# Patient Record
Sex: FEMALE | Race: WHITE | NOT HISPANIC OR LATINO | Employment: OTHER | URBAN - NONMETROPOLITAN AREA
[De-identification: names, ages, dates, MRNs, and addresses within clinical notes are randomized per-mention and may not be internally consistent; named-entity substitution may affect disease eponyms.]

---

## 2017-01-27 ENCOUNTER — TRANSCRIBE ORDERS (OUTPATIENT)
Dept: LAB | Facility: HOSPITAL | Age: 67
End: 2017-01-27

## 2017-01-27 ENCOUNTER — HOSPITAL ENCOUNTER (OUTPATIENT)
Dept: GENERAL RADIOLOGY | Facility: HOSPITAL | Age: 67
Discharge: HOME | End: 2017-01-27
Admitting: NURSE PRACTITIONER

## 2017-01-27 DIAGNOSIS — F17.290 NICOTINE DEPENDENCE, OTHER TOBACCO PRODUCT, UNCOMPLICATED: Primary | ICD-10-CM

## 2017-01-27 DIAGNOSIS — F17.290 NICOTINE DEPENDENCE, OTHER TOBACCO PRODUCT, UNCOMPLICATED: ICD-10-CM

## 2017-01-27 PROCEDURE — 71020 HC CHEST PA AND LATERAL: CPT

## 2017-05-24 ENCOUNTER — PROCEDURE VISIT (OUTPATIENT)
Dept: OTOLARYNGOLOGY | Facility: CLINIC | Age: 67
End: 2017-05-24

## 2017-05-24 ENCOUNTER — OFFICE VISIT (OUTPATIENT)
Dept: OTOLARYNGOLOGY | Facility: CLINIC | Age: 67
End: 2017-05-24

## 2017-05-24 VITALS
SYSTOLIC BLOOD PRESSURE: 119 MMHG | HEART RATE: 72 BPM | WEIGHT: 150.13 LBS | DIASTOLIC BLOOD PRESSURE: 78 MMHG | BODY MASS INDEX: 24.13 KG/M2 | TEMPERATURE: 98.6 F | HEIGHT: 66 IN

## 2017-05-24 DIAGNOSIS — H93.13 TINNITUS, BILATERAL: Primary | ICD-10-CM

## 2017-05-24 DIAGNOSIS — H90.5 HEARING LOSS, SENSORINEURAL: ICD-10-CM

## 2017-05-24 DIAGNOSIS — H90.5 HEARING LOSS, SENSORINEURAL: Primary | ICD-10-CM

## 2017-05-24 DIAGNOSIS — H93.13 TINNITUS, BILATERAL: ICD-10-CM

## 2017-05-24 DIAGNOSIS — D49.6 BRAIN TUMOR (HCC): ICD-10-CM

## 2017-05-24 PROCEDURE — 92553 AUDIOMETRY AIR & BONE: CPT | Performed by: AUDIOLOGIST-HEARING AID FITTER

## 2017-05-24 PROCEDURE — 92567 TYMPANOMETRY: CPT | Performed by: AUDIOLOGIST-HEARING AID FITTER

## 2017-05-24 PROCEDURE — 99203 OFFICE O/P NEW LOW 30 MIN: CPT | Performed by: NURSE PRACTITIONER

## 2017-05-24 RX ORDER — CITALOPRAM 40 MG/1
TABLET ORAL
COMMUNITY
End: 2022-08-26 | Stop reason: ALTCHOICE

## 2017-05-24 RX ORDER — OLMESARTAN MEDOXOMIL 40 MG/1
TABLET ORAL
COMMUNITY
Start: 2011-05-14 | End: 2022-08-26

## 2017-05-24 RX ORDER — AMLODIPINE BESYLATE 5 MG/1
TABLET ORAL
COMMUNITY
End: 2022-08-26

## 2017-05-24 RX ORDER — NITROGLYCERIN 0.4 MG/1
TABLET SUBLINGUAL
COMMUNITY
Start: 2015-03-30

## 2017-05-24 RX ORDER — CLONAZEPAM 0.5 MG/1
0.5 TABLET ORAL 3 TIMES DAILY PRN
COMMUNITY

## 2017-05-24 RX ORDER — ONDANSETRON 4 MG/1
4 TABLET, ORALLY DISINTEGRATING ORAL EVERY 8 HOURS PRN
COMMUNITY

## 2017-05-24 RX ORDER — PANTOPRAZOLE SODIUM 40 MG/1
40 TABLET, DELAYED RELEASE ORAL 2 TIMES DAILY
COMMUNITY
Start: 2016-08-26

## 2017-05-24 RX ORDER — ATORVASTATIN CALCIUM 40 MG/1
TABLET, FILM COATED ORAL
COMMUNITY
End: 2020-11-30 | Stop reason: SDUPTHER

## 2017-05-24 RX ORDER — TIZANIDINE 4 MG/1
4 TABLET ORAL EVERY 8 HOURS PRN
COMMUNITY

## 2017-05-24 RX ORDER — CLOPIDOGREL BISULFATE 75 MG/1
75 TABLET ORAL DAILY
COMMUNITY
Start: 2017-03-06

## 2017-07-24 ENCOUNTER — PROCEDURE VISIT (OUTPATIENT)
Dept: OTOLARYNGOLOGY | Facility: CLINIC | Age: 67
End: 2017-07-24

## 2017-07-24 DIAGNOSIS — H90.5 HEARING LOSS, SENSORINEURAL: Primary | ICD-10-CM

## 2017-07-24 PROCEDURE — HEARINGNOCHG: Performed by: AUDIOLOGIST-HEARING AID FITTER

## 2017-09-15 ENCOUNTER — PROCEDURE VISIT (OUTPATIENT)
Dept: OTOLARYNGOLOGY | Facility: CLINIC | Age: 67
End: 2017-09-15

## 2017-09-15 DIAGNOSIS — H90.5 HEARING LOSS, SENSORINEURAL: Primary | ICD-10-CM

## 2017-09-15 NOTE — PROGRESS NOTES
Valentina Grullon, age 66, was seen at this office for a hearing aid fitting. She has an established mild to moderate SNHL at both ears. She was fit with Signia Cellion 7PX -in-the-canal hearing aids (SN : [id number]; LE#[id number]) with size S1 receivers and 4mm domes.  The hearing aids were fit to NAL-NL2 targets using speech stimuli. She reported that the hearing aids sounded good and were not uncomfortable or too loud.   Care and use of the hearing instruments were then covered.    DIAGNOSIS:  Mrs. Grullon presented with bilateral symmetrical SNHL and fit with Signia Cellion 7PX -in-the-canal hearing aids.

## 2017-10-19 ENCOUNTER — DOCUMENTATION (OUTPATIENT)
Dept: OTOLARYNGOLOGY | Facility: CLINIC | Age: 67
End: 2017-10-19

## 2019-07-23 ENCOUNTER — OFFICE VISIT (OUTPATIENT)
Dept: PULMONOLOGY | Facility: CLINIC | Age: 69
End: 2019-07-23

## 2019-07-23 VITALS
HEIGHT: 66 IN | BODY MASS INDEX: 23.78 KG/M2 | HEART RATE: 76 BPM | DIASTOLIC BLOOD PRESSURE: 90 MMHG | WEIGHT: 148 LBS | OXYGEN SATURATION: 96 % | SYSTOLIC BLOOD PRESSURE: 140 MMHG

## 2019-07-23 DIAGNOSIS — R91.1 LUNG NODULE: ICD-10-CM

## 2019-07-23 DIAGNOSIS — J40 BRONCHITIS: ICD-10-CM

## 2019-07-23 DIAGNOSIS — R06.02 SHORTNESS OF BREATH: Primary | ICD-10-CM

## 2019-07-23 DIAGNOSIS — Z87.891 PERSONAL HISTORY OF NICOTINE DEPENDENCE: ICD-10-CM

## 2019-07-23 PROCEDURE — 99214 OFFICE O/P EST MOD 30 MIN: CPT | Performed by: NURSE PRACTITIONER

## 2019-07-23 RX ORDER — NEBIVOLOL 10 MG/1
10 TABLET ORAL DAILY
Refills: 5 | COMMUNITY
Start: 2019-07-01

## 2019-07-23 RX ORDER — BUMETANIDE 2 MG/1
1 TABLET ORAL DAILY
COMMUNITY

## 2019-07-23 RX ORDER — LOSARTAN POTASSIUM 100 MG/1
100 TABLET ORAL DAILY
COMMUNITY
Start: 2019-07-05

## 2019-07-23 RX ORDER — ALBUTEROL SULFATE 90 UG/1
2 AEROSOL, METERED RESPIRATORY (INHALATION) EVERY 4 HOURS PRN
Qty: 1 INHALER | Refills: 11 | Status: SHIPPED | OUTPATIENT
Start: 2019-07-23 | End: 2019-08-22

## 2019-07-23 NOTE — PROGRESS NOTES
AMADA Parekh  [facility]   Respiratory Disease Clinic  [address] [place] [address]  Phone: [phone]  Fax: [phone]     Valentina Grullon is a 68 y.o. female.   : 1950  CC:   Chief Complaint   Patient presents with   • Shortness of Breath      HPI: Valentina Grullon is a pleasant 68 y.o. female. The patient is here today as a referral from Dr. Baltazar for shortness of breath and lung nodule.  The patient last saw Dr. Gomez 2014.  She was being followed for asthma and a 5 mm right lower lobe nodule that had remained stable.      Shortness of breath, yes.  Cough, yes.  Productive of light brown sputum. She has used advair in the past and it seemed to help. Heartburn, yes, she is on protonix and follows with AMADA Trujillo. Joint pain, yes, she has arthritis (unknown type).  Trouble swallowing, no.  Allergies, yes, seasonal.  Does not take anything for allergies. The patient  reports that she has been smoking.  She has a 73.50 pack-year smoking history. She has never used smokeless tobacco.. Do you drink alcohol? No.  Do you use any illegal drugs or prescription drugs that are not prescribed to you? No.  Do you have a history of lung problems, yes, known lung nodules.  Do you have a history of connective tissue disease, yes, psorasis. She follows with Dr. Herrera.   Has seen Dr. Yeung in the past. Do you have pets, yes, 1 dog inside. Carpet in house, curtains in house.  Work history: motel work, housekeeping, maintenance.  Family history of lung problems, no.  Have you ever taken Methotrexate, no. Have you ever taken Amiodarone, no. She does have cardiac history with prior CABG two years ago.  She follows with [facility] cardiology. The patient's PCP is Karolyn Baltazar MD.    The following portions of the patient's history were reviewed and updated as appropriate: allergies, current medications, past family history, past medical history, past social  "history, past surgical history and problem list.  Past Medical History:   Diagnosis Date   • Adrenal mass (CMS/HCC)    • Anxiety    • Arthritis    • CAD (coronary artery disease)    • COPD (chronic obstructive pulmonary disease) (CMS/HCC)    • Depression    • Fibromyalgia    • GERD (gastroesophageal reflux disease)    • High cholesterol    • Hypertension    • IBS (irritable bowel syndrome)    • Migraine    • Osteopenia    • TIA (transient ischemic attack)    • Tinnitus    • Vitamin B12 deficiency anemia      Family History   Problem Relation Age of Onset   • No Known Problems Mother    • No Known Problems Father      Social History     Socioeconomic History   • Marital status:      Spouse name: Not on file   • Number of children: Not on file   • Years of education: Not on file   • Highest education level: Not on file   Tobacco Use   • Smoking status: Current Every Day Smoker     Packs/day: 1.50     Years: 49.00     Pack years: 73.50   • Smokeless tobacco: Never Used   Substance and Sexual Activity   • Alcohol use: No   • Drug use: Defer     Review of Systems   Constitutional: Negative for chills and fever.   HENT: Negative for congestion.    Eyes: Negative for blurred vision.   Respiratory: Positive for cough and shortness of breath.    Cardiovascular: Negative for chest pain.   Gastrointestinal: Negative for diarrhea, nausea and vomiting.   Endocrine: Negative for cold intolerance and heat intolerance.   Genitourinary: Negative for dysuria.   Musculoskeletal: Positive for back pain. Negative for arthralgias.   Skin: Negative for rash.   Neurological: Negative for dizziness, weakness and light-headedness.   Hematological: Does not bruise/bleed easily.   Psychiatric/Behavioral: Negative for agitation. The patient is not nervous/anxious.      /90   Pulse 76   Ht 167.6 cm (66\")   Wt 67.1 kg (148 lb)   SpO2 96% Comment: ra  Breastfeeding? No   BMI 23.89 kg/m²   Physical Exam   Constitutional: She is " oriented to person, place, and time. She appears well-developed and well-nourished. No distress.   HENT:   Head: Normocephalic and atraumatic.   Eyes: Conjunctivae and EOM are normal. Pupils are equal, round, and reactive to light. No scleral icterus.   Neck: Normal range of motion. Neck supple.   Cardiovascular: Normal rate, regular rhythm and normal heart sounds. Exam reveals no friction rub.   No murmur heard.  Pulmonary/Chest: Effort normal and breath sounds normal. No respiratory distress. She has no wheezes. She has no rales.   Abdominal: Soft. Bowel sounds are normal. She exhibits no distension. There is no tenderness.   Musculoskeletal: Normal range of motion. She exhibits no edema.   Neurological: She is alert and oriented to person, place, and time.   Skin: Skin is warm and dry.   Psychiatric: She has a normal mood and affect. Her behavior is normal. Judgment and thought content normal.   Nursing note and vitals reviewed.    Pulmonary Functions Testing Results:  No results found for: FEV1, FVC, IAQ9WMU, TLC, DLCO  My PFT Interpretation: None to review today  Imaging:   [facility] CT abd/pelvis 6/21/19  No acute abnormality of the abdomen or pelvis.  Large amount of stool in the colon without evidence of obstruction.  A stable left adrenal nodule.  The persistent and unchanged dilatation of common bile duct and distal  pancreatic duct.  A stable tiny nodule in the right middle lobe probably represent a  been an process. It has not changed since 2017.    Assessment and Plan:   Valentina was seen today for shortness of breath.    Diagnoses and all orders for this visit:    Shortness of breath  Patient complains of shortness of breath with exertion.  I suspect the possibility of COPD as she does have a significant smoking history.  Obtain complete pulmonary function test pre-and post at next office visit.  Bronchitis  -     albuterol sulfate  (90 Base) MCG/ACT inhaler; Inhale 2 puffs Every 4 (Four) Hours As Needed  for Wheezing or Shortness of Air for up to 30 days.  -     tiotropium bromide-olodaterol (STIOLTO RESPIMAT) 2.5-2.5 MCG/ACT aerosol solution inhaler; Inhale 2 puffs Daily for 30 days.  Will trial Stiolto.  She was also prescribed an albuterol rescue inhaler to have on hand.  Inhaler demonstration was provided.  Personal history of nicotine dependence  -     CT Chest Low Dose Wo; Future  Obtain low-dose lung cancer screening.  The patient has a known history of lung a lung nodule.  Lung nodule  Obtain low-dose lung cancer screening    Health maintenance:   Influenza vaccine: yes  Pneumovax 23: yes   Prevnar 13: yes  Patient's Body mass index is 23.89 kg/m². BMI is within normal parameters. No follow-up required..    Follow up: 3 weeks  Penelope Turner, APRN  7/23/2019  2:51 PM    Please note that portions of this note were completed with a voice recognition program.

## 2019-08-21 ENCOUNTER — HOSPITAL ENCOUNTER (OUTPATIENT)
Dept: CT IMAGING | Facility: HOSPITAL | Age: 69
Discharge: HOME | End: 2019-08-21
Admitting: NURSE PRACTITIONER

## 2019-08-21 DIAGNOSIS — Z87.891 PERSONAL HISTORY OF NICOTINE DEPENDENCE: ICD-10-CM

## 2019-08-21 PROCEDURE — G0297 LDCT FOR LUNG CA SCREEN: HCPCS

## 2020-07-16 ENCOUNTER — LAB (OUTPATIENT)
Dept: LAB | Facility: HOSPITAL | Age: 70
End: 2020-07-16

## 2020-07-16 ENCOUNTER — HOSPITAL ENCOUNTER (OUTPATIENT)
Dept: CARDIOLOGY | Facility: HOSPITAL | Age: 70
Discharge: HOME | End: 2020-07-16
Admitting: ORTHOPAEDIC SURGERY

## 2020-07-16 ENCOUNTER — TRANSCRIBE ORDERS (OUTPATIENT)
Dept: ADMINISTRATIVE | Facility: HOSPITAL | Age: 70
End: 2020-07-16

## 2020-07-16 DIAGNOSIS — I51.9 HEART DISEASE, UNSPECIFIED: ICD-10-CM

## 2020-07-16 DIAGNOSIS — I10 ESSENTIAL (PRIMARY) HYPERTENSION: Primary | ICD-10-CM

## 2020-07-16 DIAGNOSIS — I10 ESSENTIAL (PRIMARY) HYPERTENSION: ICD-10-CM

## 2020-07-16 DIAGNOSIS — Z01.810 ENCOUNTER FOR PREOPERATIVE VASCULAR EXAMINATION: ICD-10-CM

## 2020-07-16 LAB
ALBUMIN SERPL-MCNC: 4.5 G/DL (ref 3.5–5.2)
ALBUMIN/GLOB SERPL: 1.6 G/DL
ALP SERPL-CCNC: 107 U/L (ref 39–117)
ALT SERPL W P-5'-P-CCNC: 10 U/L (ref 1–33)
ANION GAP SERPL CALCULATED.3IONS-SCNC: 11.8 MMOL/L (ref 5–15)
AST SERPL-CCNC: 14 U/L (ref 1–32)
BILIRUB SERPL-MCNC: 0.3 MG/DL (ref 0–1.2)
BUN SERPL-MCNC: 19 MG/DL (ref 8–23)
BUN/CREAT SERPL: 15.4 (ref 7–25)
CALCIUM SPEC-SCNC: 9.8 MG/DL (ref 8.6–10.5)
CHLORIDE SERPL-SCNC: 100 MMOL/L (ref 98–107)
CO2 SERPL-SCNC: 24.2 MMOL/L (ref 22–29)
CREAT SERPL-MCNC: 1.23 MG/DL (ref 0.57–1)
GFR SERPL CREATININE-BSD FRML MDRD: 43 ML/MIN/1.73
GLOBULIN UR ELPH-MCNC: 2.9 GM/DL
GLUCOSE SERPL-MCNC: 97 MG/DL (ref 65–99)
POTASSIUM SERPL-SCNC: 4.5 MMOL/L (ref 3.5–5.2)
PROT SERPL-MCNC: 7.4 G/DL (ref 6–8.5)
SODIUM SERPL-SCNC: 136 MMOL/L (ref 136–145)

## 2020-07-16 PROCEDURE — 93005 ELECTROCARDIOGRAM TRACING: CPT | Performed by: ORTHOPAEDIC SURGERY

## 2020-07-16 PROCEDURE — 80053 COMPREHEN METABOLIC PANEL: CPT | Performed by: ORTHOPAEDIC SURGERY

## 2020-07-16 PROCEDURE — 36415 COLL VENOUS BLD VENIPUNCTURE: CPT

## 2020-07-16 PROCEDURE — 93010 ELECTROCARDIOGRAM REPORT: CPT | Performed by: INTERNAL MEDICINE

## 2020-11-30 ENCOUNTER — NURSE TRIAGE (OUTPATIENT)
Dept: CALL CENTER | Facility: HOSPITAL | Age: 70
End: 2020-11-30

## 2020-11-30 PROCEDURE — U0003 INFECTIOUS AGENT DETECTION BY NUCLEIC ACID (DNA OR RNA); SEVERE ACUTE RESPIRATORY SYNDROME CORONAVIRUS 2 (SARS-COV-2) (CORONAVIRUS DISEASE [COVID-19]), AMPLIFIED PROBE TECHNIQUE, MAKING USE OF HIGH THROUGHPUT TECHNOLOGIES AS DESCRIBED BY CMS-2020-01-R: HCPCS | Performed by: FAMILY MEDICINE

## 2020-11-30 NOTE — TELEPHONE ENCOUNTER
Has symptoms of COVID, wanting to be tested. Sent to , informed to wear a mask and call before going into the building    Reason for Disposition  • [1] COVID-19 infection suspected by caller or triager AND [2] mild symptoms (cough, fever, or others) AND [3] no complications or SOB    Additional Information  • Negative: COVID-19 has been diagnosed by a healthcare provider (HCP)  • Negative: COVID-19 lab test positive  • [1] Symptoms of COVID-19 (e.g., cough, fever, SOB, or others) AND [2] lives in an area with community spread  • Negative: SEVERE difficulty breathing (e.g., struggling for each breath, speaks in single words)  • Negative: Difficult to awaken or acting confused (e.g., disoriented, slurred speech)  • Negative: Bluish (or gray) lips or face now  • Negative: Shock suspected (e.g., cold/pale/clammy skin, too weak to stand, low BP, rapid pulse)  • Negative: Sounds like a life-threatening emergency to the triager  • Negative: [1] COVID-19 exposure AND [2] no symptoms  • Negative: COVID-19 and Breastfeeding, questions about  • Negative: [1] Adult with possible COVID-19 symptoms AND [2] triager concerned about severity of symptoms or other causes  • Negative: SEVERE or constant chest pain or pressure (Exception: mild central chest pain, present only when coughing)  • Negative: MODERATE difficulty breathing (e.g., speaks in phrases, SOB even at rest, pulse 100-120)  • Negative: Patient sounds very sick or weak to the triager  • Negative: MILD difficulty breathing (e.g., minimal/no SOB at rest, SOB with walking, pulse <100)  • Negative: Chest pain or pressure  • Negative: Fever > 103 F (39.4 C)  • Negative: [1] Fever > 101 F (38.3 C) AND [2] age > 60  • Negative: [1] Fever > 100.0 F (37.8 C) AND [2] bedridden (e.g., nursing home patient, CVA, chronic illness, recovering from surgery)  • Negative: HIGH RISK patient (e.g., age > 64 years, diabetes, heart or lung disease, weak immune system)  • Negative: Fever  "present > 3 days (72 hours)  • Negative: [1] Fever returns after gone for over 24 hours AND [2] symptoms worse or not improved  • Negative: [1] Continuous (nonstop) coughing interferes with work or school AND [2] no improvement using cough treatment per protocol    Answer Assessment - Initial Assessment Questions  1. CLOSE CONTACT: \"Who is the person with the confirmed or suspected COVID-19 infection that you were exposed to?\"      none  2. PLACE of CONTACT: \"Where were you when you were exposed to COVID-19?\" (e.g., home, school, medical waiting room; which city?)      no  3. TYPE of CONTACT: \"How much contact was there?\" (e.g., sitting next to, live in same house, work in same office, same building)      no  4. DURATION of CONTACT: \"How long were you in contact with the COVID-19 patient?\" (e.g., a few seconds, passed by person, a few minutes, live with the patient)      no  5. DATE of CONTACT: \"When did you have contact with a COVID-19 patient?\" (e.g., how many days ago)      na  6. TRAVEL: \"Have you traveled out of the country recently?\" If so, \"When and where?\"      * Also ask about out-of-state travel, since the CDC has identified some high-risk cities for community spread in the .      * Note: Travel becomes less relevant if there is widespread community transmission where the patient lives.      na  7. COMMUNITY SPREAD: \"Are there lots of cases of COVID-19 (community spread) where you live?\" (See public health department website, if unsure)        moderate8. SYMPTOMS: \"Do you have any symptoms?\" (e.g., fever, cough, breathing difficulty)    Body ache, sorethroat and headache  9. PREGNANCY OR POSTPARTUM: \"Is there any chance you are pregnant?\" \"When was your last menstrual period?\" \"Did you deliver in the last 2 weeks?\"     n  10. HIGH RISK: \"Do you have any heart or lung problems? Do you have a weak immune system?\" (e.g., CHF, COPD, asthma, HIV positive, chemotherapy, renal failure, diabetes mellitus, sickle " "cell anemia)   Na    Answer Assessment - Initial Assessment Questions  1. COVID-19 DIAGNOSIS: \"Who made your Coronavirus (COVID-19) diagnosis?\" \"Was it confirmed by a positive lab test?\" If not diagnosed by a HCP, ask \"Are there lots of cases (community spread) where you live?\" (See public health department website, if unsure)     moderate  2. ONSET: \"When did the COVID-19 symptoms start?\"       3 days ago headachd  3. WORST SYMPTOM: \"What is your worst symptom?\" (e.g., cough, fever, shortness of breath, muscle aches)      Sore throat and body aches  4. COUGH: \"Do you have a cough?\" If so, ask: \"How bad is the cough?\"        no  5. FEVER: \"Do you have a fever?\" If so, ask: \"What is your temperature, how was it measured, and when did it start?\"      no  6. RESPIRATORY STATUS: \"Describe your breathing?\" (e.g., shortness of breath, wheezing, unable to speak)       no  7. BETTER-SAME-WORSE: \"Are you getting better, staying the same or getting worse compared to yesterday?\"  If getting worse, ask, \"In what way?\"      worse  8. HIGH RISK DISEASE: \"Do you have any chronic medical problems?\" (e.g., asthma, heart or lung disease, weak immune system, etc.)      cabg  9. PREGNANCY: \"Is there any chance you are pregnant?\" \"When was your last menstrual period?\"      no  10. OTHER SYMPTOMS: \"Do you have any other symptoms?\"  (e.g., chills, fatigue, headache, loss of smell or taste, muscle pain, sore throat)        Loss of taste, body aches and sore throat    Protocols used: CORONAVIRUS (COVID-19) DIAGNOSED OR SUSPECTED-ADULT-AH, CORONAVIRUS (COVID-19) EXPOSURE-ADULT-AH      "

## 2021-09-30 ENCOUNTER — TELEMEDICINE (OUTPATIENT)
Dept: FAMILY MEDICINE CLINIC | Facility: CLINIC | Age: 71
End: 2021-09-30

## 2021-09-30 ENCOUNTER — TRANSCRIBE ORDERS (OUTPATIENT)
Dept: LAB | Facility: HOSPITAL | Age: 71
End: 2021-09-30

## 2021-09-30 ENCOUNTER — LAB (OUTPATIENT)
Dept: LAB | Facility: HOSPITAL | Age: 71
End: 2021-09-30

## 2021-09-30 DIAGNOSIS — Z20.822 SUSPECTED COVID-19 VIRUS INFECTION: Primary | ICD-10-CM

## 2021-09-30 DIAGNOSIS — Z01.818 PREOP TESTING: Primary | ICD-10-CM

## 2021-09-30 LAB — SARS-COV-2 ORF1AB RESP QL NAA+PROBE: NOT DETECTED

## 2021-09-30 PROCEDURE — C9803 HOPD COVID-19 SPEC COLLECT: HCPCS | Performed by: FAMILY MEDICINE

## 2021-09-30 PROCEDURE — U0004 COV-19 TEST NON-CDC HGH THRU: HCPCS | Performed by: FAMILY MEDICINE

## 2021-09-30 NOTE — PROGRESS NOTES
Patient did not want to be seen, saw Dr Baltazar arranged testing for her already.     Chief Complaint  No chief complaint on file.    Subjective    History of Present Illness      Patient presents to [facility] for   History of Present Illness     Review of Systems    I have reviewed and agree with the HPI and ROS information as above.  AMADA Cohn     Objective   Vital Signs:   There were no vitals taken for this visit.      Physical Exam     Result Review  Data Reviewed:                   Assessment and Plan      Problem List Items Addressed This Visit     None      Visit Diagnoses     Suspected COVID-19 virus infection    -  Primary              Follow Up   No follow-ups on file.  Patient was given instructions and counseling regarding her condition or for health maintenance advice. Please see specific information pulled into the AVS if appropriate.

## 2021-12-13 ENCOUNTER — TRANSCRIBE ORDERS (OUTPATIENT)
Dept: LAB | Facility: HOSPITAL | Age: 71
End: 2021-12-13

## 2021-12-13 ENCOUNTER — LAB (OUTPATIENT)
Dept: LAB | Facility: HOSPITAL | Age: 71
End: 2021-12-13

## 2021-12-13 DIAGNOSIS — Z86.16 POST-COVID SYNDROME RESOLVED: Primary | ICD-10-CM

## 2021-12-13 DIAGNOSIS — Z86.16 HISTORY OF COVID-19: ICD-10-CM

## 2021-12-13 LAB — SARS-COV-2 RNA PNL SPEC NAA+PROBE: NOT DETECTED

## 2021-12-13 PROCEDURE — 87635 SARS-COV-2 COVID-19 AMP PRB: CPT | Performed by: FAMILY MEDICINE

## 2021-12-13 PROCEDURE — C9803 HOPD COVID-19 SPEC COLLECT: HCPCS | Performed by: FAMILY MEDICINE

## 2022-04-08 ENCOUNTER — LAB (OUTPATIENT)
Dept: LAB | Facility: HOSPITAL | Age: 72
End: 2022-04-08

## 2022-04-08 ENCOUNTER — HOSPITAL ENCOUNTER (OUTPATIENT)
Dept: GENERAL RADIOLOGY | Facility: HOSPITAL | Age: 72
Discharge: HOME | End: 2022-04-08

## 2022-04-08 ENCOUNTER — TRANSCRIBE ORDERS (OUTPATIENT)
Dept: ADMINISTRATIVE | Facility: HOSPITAL | Age: 72
End: 2022-04-08

## 2022-04-08 DIAGNOSIS — I10 ESSENTIAL (PRIMARY) HYPERTENSION: ICD-10-CM

## 2022-04-08 DIAGNOSIS — F41.1 GENERALIZED ANXIETY DISORDER: ICD-10-CM

## 2022-04-08 DIAGNOSIS — M54.16 LUMBAR RADICULOPATHY: Primary | ICD-10-CM

## 2022-04-08 DIAGNOSIS — E53.8 COBALAMIN A DISEASE: ICD-10-CM

## 2022-04-08 DIAGNOSIS — J44.9 CHRONIC OBSTRUCTIVE BRONCHITIS: Primary | ICD-10-CM

## 2022-04-08 DIAGNOSIS — E55.9 VITAMIN D DEFICIENCY DISEASE: ICD-10-CM

## 2022-04-08 DIAGNOSIS — Z86.73 HISTORY OF STROKE: ICD-10-CM

## 2022-04-08 DIAGNOSIS — I25.10 DISEASE OF CARDIOVASCULAR SYSTEM: ICD-10-CM

## 2022-04-08 DIAGNOSIS — K21.9 CARDIOCHALASIA: ICD-10-CM

## 2022-04-08 LAB
25(OH)D3 SERPL-MCNC: 15.8 NG/ML (ref 30–100)
ALBUMIN SERPL-MCNC: 4.7 G/DL (ref 3.5–5)
ALBUMIN/GLOB SERPL: 1.4 G/DL (ref 1.1–2.5)
ALP SERPL-CCNC: 117 U/L (ref 24–120)
ALT SERPL W P-5'-P-CCNC: 21 U/L (ref 0–35)
ANION GAP SERPL CALCULATED.3IONS-SCNC: 2 MMOL/L (ref 4–13)
AST SERPL-CCNC: 32 U/L (ref 7–45)
AUTO MIXED CELLS #: 0.5 10*3/MM3 (ref 0.1–2.6)
AUTO MIXED CELLS %: 7.6 % (ref 0.1–24)
BILIRUB SERPL-MCNC: 0.6 MG/DL (ref 0.1–1)
BUN SERPL-MCNC: 22 MG/DL (ref 5–21)
BUN/CREAT SERPL: 16.7
CALCIUM SPEC-SCNC: 9.9 MG/DL (ref 8.4–10.4)
CHLORIDE SERPL-SCNC: 107 MMOL/L (ref 98–110)
CHOLEST SERPL-MCNC: 137 MG/DL (ref 130–200)
CO2 SERPL-SCNC: 28 MMOL/L (ref 24–31)
CREAT SERPL-MCNC: 1.32 MG/DL (ref 0.5–1.4)
EGFRCR SERPLBLD CKD-EPI 2021: 43.5 ML/MIN/1.73
ERYTHROCYTE [DISTWIDTH] IN BLOOD BY AUTOMATED COUNT: 13.1 % (ref 12.3–15.4)
GLOBULIN UR ELPH-MCNC: 3.4 GM/DL
GLUCOSE SERPL-MCNC: 111 MG/DL (ref 70–100)
HCT VFR BLD AUTO: 39.9 % (ref 34–46.6)
HDLC SERPL-MCNC: 57 MG/DL
HGB BLD-MCNC: 13.1 G/DL (ref 12–15.9)
LDLC SERPL CALC-MCNC: 58 MG/DL (ref 0–99)
LDLC/HDLC SERPL: 0.95 {RATIO}
LYMPHOCYTES # BLD AUTO: 1.7 10*3/MM3 (ref 0.7–3.1)
LYMPHOCYTES NFR BLD AUTO: 26.8 % (ref 19.6–45.3)
MCH RBC QN AUTO: 27.7 PG (ref 26.6–33)
MCHC RBC AUTO-ENTMCNC: 32.8 G/DL (ref 31.5–35.7)
MCV RBC AUTO: 84.4 FL (ref 79–97)
NEUTROPHILS NFR BLD AUTO: 4.2 10*3/MM3 (ref 1.7–7)
NEUTROPHILS NFR BLD AUTO: 65.6 % (ref 42.7–76)
PLATELET # BLD AUTO: 179 10*3/MM3 (ref 140–450)
PMV BLD AUTO: 9.8 FL (ref 6–12)
POTASSIUM SERPL-SCNC: 4.5 MMOL/L (ref 3.5–5.3)
PROT SERPL-MCNC: 8.1 G/DL (ref 6.3–8.7)
RBC # BLD AUTO: 4.73 10*6/MM3 (ref 3.77–5.28)
SODIUM SERPL-SCNC: 137 MMOL/L (ref 135–145)
TRIGL SERPL-MCNC: 128 MG/DL (ref 0–149)
VIT B12 BLD-MCNC: 276 PG/ML (ref 211–946)
VLDLC SERPL-MCNC: 22 MG/DL (ref 5–40)
WBC NRBC COR # BLD: 6.4 10*3/MM3 (ref 3.4–10.8)

## 2022-04-08 PROCEDURE — 85025 COMPLETE CBC W/AUTO DIFF WBC: CPT | Performed by: FAMILY MEDICINE

## 2022-04-08 PROCEDURE — 80061 LIPID PANEL: CPT | Performed by: FAMILY MEDICINE

## 2022-04-08 PROCEDURE — 82607 VITAMIN B-12: CPT | Performed by: FAMILY MEDICINE

## 2022-04-08 PROCEDURE — 72120 X-RAY BEND ONLY L-S SPINE: CPT

## 2022-04-08 PROCEDURE — 80053 COMPREHEN METABOLIC PANEL: CPT | Performed by: FAMILY MEDICINE

## 2022-04-08 PROCEDURE — 82306 VITAMIN D 25 HYDROXY: CPT | Performed by: FAMILY MEDICINE

## 2022-04-08 PROCEDURE — 36415 COLL VENOUS BLD VENIPUNCTURE: CPT | Performed by: FAMILY MEDICINE

## 2022-08-26 ENCOUNTER — OFFICE VISIT (OUTPATIENT)
Dept: GASTROENTEROLOGY | Facility: CLINIC | Age: 72
End: 2022-08-26

## 2022-08-26 VITALS
SYSTOLIC BLOOD PRESSURE: 126 MMHG | BODY MASS INDEX: 23.14 KG/M2 | WEIGHT: 144 LBS | HEIGHT: 66 IN | OXYGEN SATURATION: 97 % | TEMPERATURE: 97.1 F | DIASTOLIC BLOOD PRESSURE: 78 MMHG | HEART RATE: 69 BPM

## 2022-08-26 DIAGNOSIS — K63.5 POLYP OF COLON, UNSPECIFIED PART OF COLON, UNSPECIFIED TYPE: ICD-10-CM

## 2022-08-26 DIAGNOSIS — R10.10 PAIN OF UPPER ABDOMEN: Primary | ICD-10-CM

## 2022-08-26 PROBLEM — K59.00 CONSTIPATION: Status: ACTIVE | Noted: 2022-08-26

## 2022-08-26 PROCEDURE — 99204 OFFICE O/P NEW MOD 45 MIN: CPT | Performed by: NURSE PRACTITIONER

## 2022-08-26 RX ORDER — ERGOCALCIFEROL 1.25 MG/1
50000 CAPSULE ORAL WEEKLY
COMMUNITY
Start: 2022-08-17

## 2022-08-26 NOTE — ASSESSMENT & PLAN NOTE
More recently though this pain has become more frequent and severe in nature.  Still on and off rather than a constant pain.  She has constant nausea on a daily basis.  Her appetite has been good with no significant unexpected weight loss.

## 2022-08-26 NOTE — PROGRESS NOTES
"Primary Physician: Karolyn Baltazar MD    Chief Complaint   Patient presents with   • Abdominal Pain     Pt c/o RUQ pain intermittently \"for a long time\"-seems worse after eating; Pt had endo 7/16/2021 at [facility]   • Diarrhea     Pt also c/o diarrhea \"for years\"-states she never knows when it will hit; Pt's last colon 5/2/2019 at [facility]-also had one 7/17/2018 at [facility]        Subjective     Valentina Grullon is a 71 y.o. female.    HPI   Personal Hx Colon Polyps-  Pt reports having had colon polyps in the past at [facility], no records available today for review.  She does say she had colon polyps in the past, but not records available today for review.  Last colonoscopy 5/2019.    No family history of colon cancer.    Constipation/Diarrhea-  Chronic in nature, self treats with over-the-counter Dulcolax tablet just 1 no more than every 4 days.  At times this will then lead to diarrhea issues.  When she has her diarrhea issues at times she cannot control her bowels.   No obvious blood in her stool.  Reports her stools looking dark in nature.      Chronic Abdominal pain/Nausea-  More recently though this pain has become more frequent and severe in nature.  Still on and off rather than a constant pain.  She has constant nausea on a daily basis.  Her appetite has been good with no significant unexpected weight loss.    She has a long history of chronic epigastric and right upper quadrant pain.  She has been seen in [place] approximately 8 years ago and had some type of duct work done.  We will have asked for those records to be sent to us for our review.  July 2021 US Liver (for RUQ Pain) was essentially unremarkable completed by [email or web address].    6/21/2019 CT Scan of Abd/Pelvis (for RUQ Pain) which was unrevealing of acute processes, + stool collected by [email or web address].        Past Medical History:   Diagnosis Date   • Adrenal mass (HCC)    • Anxiety    • Arthritis    • CAD (coronary artery disease)    • COPD (chronic " obstructive pulmonary disease) (HCC)    • Depression    • Fibromyalgia    • GERD (gastroesophageal reflux disease)    • High cholesterol    • Hypertension    • IBS (irritable bowel syndrome)    • Migraine    • Osteopenia    • TIA (transient ischemic attack)    • Tinnitus    • Vitamin B12 deficiency anemia        Past Surgical History:   Procedure Laterality Date   • APPENDECTOMY     • BRAIN MENINGIOMA EXCISION      tumor/radiation   • CHOLECYSTECTOMY     • CORONARY STENT PLACEMENT     • HYSTERECTOMY          Current Outpatient Medications:   •  bumetanide (BUMEX) 2 MG tablet, Take 1 mg by mouth Daily., Disp: , Rfl:   •  clonazePAM (KlonoPIN) 0.5 MG tablet, Take 0.5 mg by mouth 3 (Three) Times a Day As Needed., Disp: , Rfl:   •  clopidogrel (PLAVIX) 75 MG tablet, Take 75 mg by mouth Daily., Disp: , Rfl:   •  HYDROcodone-acetaminophen (NORCO)  MG per tablet, Take 10 mg by mouth Every 8 (Eight) Hours., Disp: , Rfl:   •  losartan (COZAAR) 100 MG tablet, Take 100 mg by mouth Daily., Disp: , Rfl:   •  nebivolol (BYSTOLIC) 10 MG tablet, Take 10 mg by mouth Daily., Disp: , Rfl: 5  •  nitroglycerin (NITROSTAT) 0.4 MG SL tablet, Place  under the tongue., Disp: , Rfl:   •  ondansetron ODT (ZOFRAN-ODT) 4 MG disintegrating tablet, Place 4 mg on the tongue Every 8 (Eight) Hours As Needed., Disp: , Rfl:   •  pantoprazole (PROTONIX) 40 MG EC tablet, Take 40 mg by mouth 2 (Two) Times a Day., Disp: , Rfl:   •  rosuvastatin (CRESTOR) 40 MG tablet, Take 40 mg by mouth Daily., Disp: , Rfl:   •  sertraline (ZOLOFT) 50 MG tablet, Take 150 mg by mouth Daily., Disp: , Rfl:   •  tiZANidine (ZANAFLEX) 4 MG tablet, Take 4 mg by mouth Every 8 (Eight) Hours As Needed., Disp: , Rfl:   •  vitamin D (ERGOCALCIFEROL) 1.25 MG (01198 UT) capsule capsule, Take 50,000 Units by mouth 1 (One) Time Per Week., Disp: , Rfl:     Allergies   Allergen Reactions   • Atenolol Other (See Comments)     Fatigue and hypotension   • Metoprolol Other (See  "Comments)     Fatigue   • Prednisone Rash and Headache     Skin turns \"real red\"    • Morphine Nausea And Vomiting       Social History     Socioeconomic History   • Marital status: Legally    Tobacco Use   • Smoking status: Current Every Day Smoker     Packs/day: 1.00     Years: 49.00     Pack years: 49.00     Types: Cigarettes   • Smokeless tobacco: Never Used   Vaping Use   • Vaping Use: Former   • Substances: Nicotine   • Devices: Disposable   Substance and Sexual Activity   • Alcohol use: Not Currently   • Drug use: Defer   • Sexual activity: Defer       Family History   Problem Relation Age of Onset   • No Known Problems Mother    • No Known Problems Father    • Colon cancer Neg Hx    • Colon polyps Neg Hx    • Esophageal cancer Neg Hx    • Liver cancer Neg Hx    • Liver disease Neg Hx    • Rectal cancer Neg Hx    • Stomach cancer Neg Hx        Review of Systems   Constitutional: Negative for unexpected weight change.   Respiratory: Positive for shortness of breath.         Chronic SOB on and off.  Worse with activity.  Continues to smoke.   Cardiovascular: Negative for chest pain.       Objective     /78 (BP Location: Left arm, Patient Position: Sitting, Cuff Size: Adult)   Pulse 69   Temp 97.1 °F (36.2 °C) (Infrared)   Ht 167.6 cm (66\")   Wt 65.3 kg (144 lb)   SpO2 97%   Breastfeeding No   BMI 23.24 kg/m²     Physical Exam  Vitals reviewed.   Cardiovascular:      Rate and Rhythm: Normal rate and regular rhythm.   Abdominal:      General: Abdomen is flat. Bowel sounds are normal.      Palpations: Abdomen is soft.         Lab Results - Last 18 Months   Lab Units 04/08/22  1024 08/09/21  1219 07/12/21  1324   GLUCOSE mg/dL 111* 92  --    BUN mg/dL 22* 16  --    CREATININE mg/dL 1.32 1.3*  --    SODIUM mmol/L 137 140  --    POTASSIUM mmol/L 4.5 5.0  --    CHLORIDE mmol/L 107 105  --    TOTAL CO2 mmol/L  --  28  --    CO2 mmol/L 28.0  --   --    TOTAL PROTEIN g/dL 8.1 7.1 7.0   ALBUMIN g/dL " 4.70 4.4 4.8   ALT (SGPT) U/L 21 14 9   AST (SGOT) U/L 32 24 17   ALK PHOS U/L 117 105* 116*   BILIRUBIN mg/dL 0.6 0.3 0.3   GLOBULIN gm/dL 3.4  --   --    SED RATE mm/Hr  --  21  --        Lab Results - Last 18 Months   Lab Units 04/08/22  1024 08/09/21  1219 04/03/21  2305   HEMOGLOBIN g/dL 13.1 11.5* 11.6*   HEMATOCRIT % 39.9 36.8* 37.2   MCV fL 84.4 88.2 87.1   WBC 10*3/mm3 6.40 5.8 7.8   RDW % 13.1 13.6 14.0   MPV fL 9.8 11.0 11.3   PLATELETS 10*3/mm3 179 193 191       Lab Results - Last 18 Months   Lab Units 04/08/22  1024   VIT D 25 HYDROXY ng/ml 15.8*      US LIVER  Order: [id number]    Impression    1. Prior cholecystectomy. Common duct measures 1.1 cm and is similar   in size compared to the CT on 6/21/2019.   2. Liver echogenicity is normal.   3. Echogenic pancreas can be a normal variant. It can also be seen in   patients with diabetes and prediabetes.   4. Spleen size within normal limits.   Signed by Dr Jose Maria Buckley    Narrative    EXAMINATION:  US LIVER  7/2/2021 3:34 PM   HISTORY: Chronic right upper quadrant pain.   COMPARISON: CT abdomen and pelvis on 6/21/2019.   TECHNIQUE: Grayscale and color flow imaging was performed.   FINDINGS: The visualized pancreas is echogenic. The pancreas is only   partially imaged. There has been prior cholecystectomy. The common   duct measures 1.1 cm. The common duct also measures about the same on   the prior CT study. The visualized abdominal aorta and inferior vena   cava are normal caliber. The liver echogenicity is normal. The right   kidney measures 10.7 cm. The cortical thickness and echogenicity are   normal. There is no hydronephrosis. The spleen measures 10.6 x 4.1 x   10.6 cm.            6/21/2019:  CT ABDOMEN PELVIS W IV CONTRAST Additional Contrast? Oral  Order: [id number]    Impression    No acute abnormality of the abdomen or pelvis.   Large amount of stool in the colon without evidence of obstruction.   A stable left adrenal nodule.   The persistent  and unchanged dilatation of common bile duct and distal   pancreatic duct.   A stable tiny nodule in the right middle lobe probably represent a   been an process. It has not changed since 2017.   Signed by Dr James Herrera on 6/21/2019 12:57 PM    Narrative    Examination. CT ABDOMEN PELVIS W IV CONTRAST   History: Right upper quadrant abdominal pain and diarrhea.   DLP: 393 mGycm.   The CT scan of the abdomen and pelvis is performed after oral and   intravenous contrast enhancement. The images are acquired in axial   plane with subsequent reconstruction in coronal and sagittal planes.   The comparison is made with the previous study dated 9/20/2017.   The included lower lungs but unremarkable except for a small nodule in   the right middle lobe which is stable since the previous study.   The cardiomediastinal structures are unremarkable. Left atrial   appendages clamp is seen in place. Atheromatous changes of coronary   arteries are noted.   The liver and spleen appear normal. Intrahepatic biliary dilatation is   noted.   The gallbladder is surgically absent. There is persistent moderate   dilatation of common bile duct which is unchanged.   The pancreas appear normal. There is mild dilatation of the pancreatic   duct which is also stable.   Left adrenal nodule measures 1.8 cm in greatest dimension. This is   unchanged. The right adrenal gland is normal. The renal outline   bilaterally is normal except for a tiny nodule laterally in the upper   pole which is too small to be further characterized. No   hydronephrosis. The urinary bladder is poorly distended and may not be   optimally evaluated.   The stomach and duodenum appear normal. Normal and nondistended small   bowel is noted. Appendix is surgically absent. There is a large amount   of stool in the colon. No finding to suggest obstruction.   Severe atheromatous changes of the abdominal aorta and iliac arteries   are seen.   There is no evidence of abdominal  or pelvic lymphadenopathy.   Moderate chronic degenerative changes of the lumbar spine are seen   with a mild levoscoliosis.  Exam End: 06/21/19 10:28 Last Resulted: 06/21/19 13:57         IMPRESSION/PLAN:    Assessment & Plan      Problem List Items Addressed This Visit        Gastrointestinal Abdominal     Colon polyps    Overview     Pt reports having had colon polyps in the past at [facility], no records available today for review.   Last colonoscopy  She believes was 5/2019.  Records have been requested for review.         Chronic Pain of upper abdomen - Primary    Overview     She has a long history of chronic epigastric and right upper quadrant pain.  She has been seen in [place] approximately 8 years ago and had some type of duct work done.  We will have asked for those records to be sent to us for our review.  July 2021 US Liver (for RUQ Pain) was essentially unremarkable   6/21/2019 CT Scan of Abd/Pelvis (for RUQ Pain) which was unrevealing of acute processes, + stool             Current Assessment & Plan     More recently though this pain has become more frequent and severe in nature.  Still on and off rather than a constant pain.  She has constant nausea on a daily basis.  Her appetite has been good with no significant unexpected weight loss.         Relevant Orders    Comprehensive Metabolic Panel    CBC (No Diff)    Amylase    Lipase        The problems that the patient presents with today have been noted to be chronic in nature for years.  I have reviewed ultrasound of the abdomen from 2021.  CT scan of the abdomen in 2019.  Those were  both done for the chronic right upper quadrant pain.  We have sent for records from both [facility] as well as [place] for review of all procedures and evaluations done.  I am going to begin with lab work today to include CBC, CMP, amylase and lipase.  Rule out any acute laboratory findings.  Again I will review records once they become available to me and see  the patient in follow-up in 1 month              Stephanie Philip, APRN  08/26/22  11:20 CDT    Much of this encounter note is an electronic transcription/translation of spoken language to printed text. The electronic translation of spoken language may permit erroneous, or at times, nonsensical words or phrases to be inadvertently transcribed; although I have reviewed the note for such errors, some may still exist.

## 2022-09-07 ENCOUNTER — LAB (OUTPATIENT)
Dept: LAB | Facility: HOSPITAL | Age: 72
End: 2022-09-07

## 2022-09-07 LAB
ALBUMIN SERPL-MCNC: 4.5 G/DL (ref 3.5–5.2)
ALBUMIN/GLOB SERPL: 1.7 G/DL
ALP SERPL-CCNC: 127 U/L (ref 39–117)
ALT SERPL W P-5'-P-CCNC: 9 U/L (ref 1–33)
AMYLASE SERPL-CCNC: 72 U/L (ref 28–100)
ANION GAP SERPL CALCULATED.3IONS-SCNC: 7 MMOL/L (ref 5–15)
AST SERPL-CCNC: 18 U/L (ref 1–32)
BILIRUB SERPL-MCNC: 0.2 MG/DL (ref 0–1.2)
BUN SERPL-MCNC: 15 MG/DL (ref 8–23)
BUN/CREAT SERPL: 13.2 (ref 7–25)
CALCIUM SPEC-SCNC: 9.5 MG/DL (ref 8.6–10.5)
CHLORIDE SERPL-SCNC: 104 MMOL/L (ref 98–107)
CO2 SERPL-SCNC: 28 MMOL/L (ref 22–29)
CREAT SERPL-MCNC: 1.14 MG/DL (ref 0.57–1)
DEPRECATED RDW RBC AUTO: 46.4 FL (ref 37–54)
EGFRCR SERPLBLD CKD-EPI 2021: 51.9 ML/MIN/1.73
ERYTHROCYTE [DISTWIDTH] IN BLOOD BY AUTOMATED COUNT: 14.6 % (ref 12.3–15.4)
GLOBULIN UR ELPH-MCNC: 2.6 GM/DL
GLUCOSE SERPL-MCNC: 93 MG/DL (ref 65–99)
HCT VFR BLD AUTO: 37.7 % (ref 34–46.6)
HGB BLD-MCNC: 11.6 G/DL (ref 12–15.9)
LIPASE SERPL-CCNC: 73 U/L (ref 13–60)
MCH RBC QN AUTO: 26.7 PG (ref 26.6–33)
MCHC RBC AUTO-ENTMCNC: 30.8 G/DL (ref 31.5–35.7)
MCV RBC AUTO: 86.9 FL (ref 79–97)
PLATELET # BLD AUTO: 181 10*3/MM3 (ref 140–450)
PMV BLD AUTO: 10.6 FL (ref 6–12)
POTASSIUM SERPL-SCNC: 4.2 MMOL/L (ref 3.5–5.2)
PROT SERPL-MCNC: 7.1 G/DL (ref 6–8.5)
RBC # BLD AUTO: 4.34 10*6/MM3 (ref 3.77–5.28)
SODIUM SERPL-SCNC: 139 MMOL/L (ref 136–145)
WBC NRBC COR # BLD: 5.87 10*3/MM3 (ref 3.4–10.8)

## 2022-09-07 PROCEDURE — 85027 COMPLETE CBC AUTOMATED: CPT | Performed by: NURSE PRACTITIONER

## 2022-09-07 PROCEDURE — 83690 ASSAY OF LIPASE: CPT | Performed by: NURSE PRACTITIONER

## 2022-09-07 PROCEDURE — 36415 COLL VENOUS BLD VENIPUNCTURE: CPT | Performed by: NURSE PRACTITIONER

## 2022-09-07 PROCEDURE — 80053 COMPREHEN METABOLIC PANEL: CPT | Performed by: NURSE PRACTITIONER

## 2022-09-07 PROCEDURE — 82150 ASSAY OF AMYLASE: CPT | Performed by: NURSE PRACTITIONER

## 2022-09-26 ENCOUNTER — TELEPHONE (OUTPATIENT)
Dept: GASTROENTEROLOGY | Facility: CLINIC | Age: 72
End: 2022-09-26

## 2022-09-26 NOTE — TELEPHONE ENCOUNTER
After thorough review of prior records from [facility], I feel is it best for Valentina Grullon to follow back up with Dr Rayshawn Herrera.  He had last seen her in 2021 and said on his operative note if she had persistent pain or elevated LFT's he would consider ERCP with stenting for possibility of ampullary stenosis and Sphincter of Oddi Dysfunction.  Her ALKP is elevated again mildly at 127 and lipase mildly elevated at 73.    She has a history of dilated biliary tree and h/o previous ERCP post cholecystectomy with stenting.  Let's please move forward with referral and have her keep her GI care with Dr Herrera.    Thanks  Ashanti PINEDA        My Review of Docs included:  Colon/ENDO 7/16/2021:sm bowel biopsy negative, stomach biopsy negative, random colon negative for colitis or dysplasia,  EUS 7/16/2021 No CBD filling defects.  CBD mild dilated 9mm.  MPD mild dilated 3.8mm in the head of pancreas and 2.4mm in the body of pancreas tapering smoothly into tail.  No panc mass or lymphadenopathy. Normal LFT's

## 2023-02-22 ENCOUNTER — LAB (OUTPATIENT)
Dept: LAB | Facility: HOSPITAL | Age: 73
End: 2023-02-22
Payer: MEDICARE

## 2023-02-22 ENCOUNTER — TRANSCRIBE ORDERS (OUTPATIENT)
Dept: ADMINISTRATIVE | Facility: HOSPITAL | Age: 73
End: 2023-02-22
Payer: MEDICARE

## 2023-02-22 DIAGNOSIS — F41.1 GENERALIZED ANXIETY DISORDER: ICD-10-CM

## 2023-02-22 DIAGNOSIS — K21.9 CHALASIA OF LOWER ESOPHAGEAL SPHINCTER: ICD-10-CM

## 2023-02-22 DIAGNOSIS — E78.2 MIXED HYPERLIPIDEMIA: ICD-10-CM

## 2023-02-22 DIAGNOSIS — E53.8 BIOTIN-(PROPIONYL-COA-CARBOXYLASE) LIGASE DEFICIENCY: ICD-10-CM

## 2023-02-22 DIAGNOSIS — I10 ESSENTIAL HYPERTENSION, MALIGNANT: ICD-10-CM

## 2023-02-22 DIAGNOSIS — I25.10 DISEASE OF CARDIOVASCULAR SYSTEM: ICD-10-CM

## 2023-02-22 DIAGNOSIS — J44.9 OBSTRUCTIVE CHRONIC BRONCHITIS WITHOUT EXACERBATION: Primary | ICD-10-CM

## 2023-02-22 DIAGNOSIS — J44.9 OBSTRUCTIVE CHRONIC BRONCHITIS WITHOUT EXACERBATION: ICD-10-CM

## 2023-02-22 LAB
ALBUMIN SERPL-MCNC: 4.2 G/DL (ref 3.5–5)
ALBUMIN/GLOB SERPL: 1.4 G/DL (ref 1.1–2.5)
ALP SERPL-CCNC: 95 U/L (ref 24–120)
ALT SERPL W P-5'-P-CCNC: 18 U/L (ref 0–35)
ANION GAP SERPL CALCULATED.3IONS-SCNC: 8 MMOL/L (ref 4–13)
AST SERPL-CCNC: 26 U/L (ref 7–45)
AUTO MIXED CELLS #: 0.5 10*3/MM3 (ref 0.1–2.6)
AUTO MIXED CELLS %: 8.9 % (ref 0.1–24)
BILIRUB SERPL-MCNC: 0.3 MG/DL (ref 0.1–1)
BUN SERPL-MCNC: 21 MG/DL (ref 5–21)
BUN/CREAT SERPL: 15
CALCIUM SPEC-SCNC: 9.2 MG/DL (ref 8.4–10.4)
CHLORIDE SERPL-SCNC: 106 MMOL/L (ref 98–110)
CHOLEST SERPL-MCNC: 129 MG/DL (ref 130–200)
CO2 SERPL-SCNC: 28 MMOL/L (ref 24–31)
CREAT SERPL-MCNC: 1.4 MG/DL (ref 0.5–1.4)
EGFRCR SERPLBLD CKD-EPI 2021: 40.3 ML/MIN/1.73
ERYTHROCYTE [DISTWIDTH] IN BLOOD BY AUTOMATED COUNT: 13.1 % (ref 12.3–15.4)
GLOBULIN UR ELPH-MCNC: 3 GM/DL
GLUCOSE SERPL-MCNC: 108 MG/DL (ref 70–100)
HCT VFR BLD AUTO: 36.6 % (ref 34–46.6)
HDLC SERPL-MCNC: 47 MG/DL
HGB BLD-MCNC: 11.6 G/DL (ref 12–15.9)
LDLC SERPL CALC-MCNC: 62 MG/DL (ref 0–99)
LDLC/HDLC SERPL: 1.28 {RATIO}
LYMPHOCYTES # BLD AUTO: 1.6 10*3/MM3 (ref 0.7–3.1)
LYMPHOCYTES NFR BLD AUTO: 31.1 % (ref 19.6–45.3)
MCH RBC QN AUTO: 27.4 PG (ref 26.6–33)
MCHC RBC AUTO-ENTMCNC: 31.7 G/DL (ref 31.5–35.7)
MCV RBC AUTO: 86.3 FL (ref 79–97)
NEUTROPHILS NFR BLD AUTO: 3 10*3/MM3 (ref 1.7–7)
NEUTROPHILS NFR BLD AUTO: 60 % (ref 42.7–76)
PLATELET # BLD AUTO: 170 10*3/MM3 (ref 140–450)
PMV BLD AUTO: 10 FL (ref 6–12)
POTASSIUM SERPL-SCNC: 4.5 MMOL/L (ref 3.5–5.3)
PROT SERPL-MCNC: 7.2 G/DL (ref 6.3–8.7)
RBC # BLD AUTO: 4.24 10*6/MM3 (ref 3.77–5.28)
SODIUM SERPL-SCNC: 142 MMOL/L (ref 135–145)
TRIGL SERPL-MCNC: 110 MG/DL (ref 0–149)
VIT B12 BLD-MCNC: 479 PG/ML (ref 211–946)
VLDLC SERPL-MCNC: 20 MG/DL (ref 5–40)
WBC NRBC COR # BLD: 5.1 10*3/MM3 (ref 3.4–10.8)

## 2023-02-22 PROCEDURE — 82607 VITAMIN B-12: CPT

## 2023-02-22 PROCEDURE — 36415 COLL VENOUS BLD VENIPUNCTURE: CPT

## 2023-02-22 PROCEDURE — 80053 COMPREHEN METABOLIC PANEL: CPT | Performed by: FAMILY MEDICINE

## 2023-02-22 PROCEDURE — 80061 LIPID PANEL: CPT

## 2023-02-22 PROCEDURE — 85025 COMPLETE CBC W/AUTO DIFF WBC: CPT

## 2023-03-02 ENCOUNTER — TRANSCRIBE ORDERS (OUTPATIENT)
Dept: ADMINISTRATIVE | Facility: HOSPITAL | Age: 73
End: 2023-03-02
Payer: MEDICARE

## 2023-03-02 ENCOUNTER — HOSPITAL ENCOUNTER (OUTPATIENT)
Dept: GENERAL RADIOLOGY | Facility: HOSPITAL | Age: 73
Discharge: HOME | End: 2023-03-02
Admitting: FAMILY MEDICINE
Payer: MEDICARE

## 2023-03-02 DIAGNOSIS — R05.9 COUGH, UNSPECIFIED TYPE: Primary | ICD-10-CM

## 2023-03-02 DIAGNOSIS — R05.9 COUGH, UNSPECIFIED TYPE: ICD-10-CM

## 2023-03-02 PROCEDURE — 71046 X-RAY EXAM CHEST 2 VIEWS: CPT
